# Patient Record
Sex: MALE | Race: WHITE
[De-identification: names, ages, dates, MRNs, and addresses within clinical notes are randomized per-mention and may not be internally consistent; named-entity substitution may affect disease eponyms.]

---

## 2019-08-17 ENCOUNTER — HOSPITAL ENCOUNTER (EMERGENCY)
Dept: HOSPITAL 44 - ED | Age: 34
Discharge: HOME | End: 2019-08-17
Payer: SELF-PAY

## 2019-08-17 VITALS — DIASTOLIC BLOOD PRESSURE: 58 MMHG | SYSTOLIC BLOOD PRESSURE: 118 MMHG

## 2019-08-17 DIAGNOSIS — M79.641: Primary | ICD-10-CM

## 2019-08-17 PROCEDURE — A9270 NON-COVERED ITEM OR SERVICE: HCPCS

## 2019-08-17 PROCEDURE — 99284 EMERGENCY DEPT VISIT MOD MDM: CPT

## 2019-08-17 PROCEDURE — 73130 X-RAY EXAM OF HAND: CPT

## 2019-08-17 PROCEDURE — 96372 THER/PROPH/DIAG INJ SC/IM: CPT

## 2019-08-17 NOTE — DIAGNOSTIC IMAGING REPORT
MELI MARTINEZ 

[facility]

[address] [facility] [address]

[place], [place]. [address]

 

 

 

 

Report Submission Date: Aug 17, 2019 6:17:51 PM CDT

Patient       Study

Name:   EDI DANIEL       Date:   Aug 17, 2019 5:56:40 PM CDT

MRN:   [id number]       Modality Type:   DX

Gender:   M       Description:   HAND 3 VIEWS OR MORE

:   [birth year]       Institution:   [facility]

Physician:   MELI MARTINEZ

     Accession:    [id number]

 

 

Right hand three views 



History:  3 days of hand pain 



Findings:  The right hand is intact without fracture, dislocation, arthropathy, 
or focal bone lesion.  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

 

Electronically signed on Aug 17, 2019 6:17:51 PM CDT by:

Jace VASQUEZ

## 2019-08-17 NOTE — ED PHYSICIAN DOCUMENTATION
Hand Injury





- HISTORIAN


Historian: patient





- HPI


Stated Complaint: right hand pain x 3 days


Chief Complaint: Hand Injury


Onset: days ago


Where: home


Severity: moderate


Duration: persistent since


Context: other (he is not sure )


Location of Injury: R hand


Modifying Factors: pain on movement


Further Comments: yes (HE states under on the ayala side he had an area two 

days ago he did get green/yellow drainage from the area. He states the area has 

healed somewhat although he states today the hand started to swell and there has

been increased pain over hand. No further drainage. He has not taken any meds 

OTC meds for the pain today. No fever. No injury)





- ROS


CONST: no problems


NEURO: none


CVS/RESP: none


EYES/ENT: none


MS/SKIN/LYMPH: other (small crusted area on right palm )





- PAST HX


Past History: none


Immunizations: UTD


Allergies/Adverse Reactions: 


                                    Allergies











Allergy/AdvReac Type Severity Reaction Status Date / Time


 


No Known Allergies Allergy   Verified 08/17/19 17:56














Home Medications: 


                                Ambulatory Orders











 Medication  Instructions  Recorded


 


NK  10/16/16














- SOCIAL HX


Smoking History: cigarettes


Alcohol Use: none


Drug Use: none





- FAMILY HX


Family History: none





- VITAL SIGNS


Vital Signs: 





                                   Vital Signs











Temp Pulse Resp BP Pulse Ox


 


          125/67    


 


          10/16/16 19:13   














- REVIEWED ASSESSMENTS


Nursing Assessment  Reviewed: Yes


Vitals Reviewed: Yes





Progress





- Progress


Progress: 





1850: Discussed results and plan he is agreeable. Discussed pain is slightly 

improved DG 





ED Results Lab/Radiology





- Radiology


Radiology Impressions: 





Right hand three views 





History: 3 days of hand pain 





Findings: The right hand is intact without fracture, dislocation, arthropathy, 

or focal bone lesion. 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 


Electronically signed on Aug 17, 2019 6:17:51 PM CDT by: 


Jace Garcia





Hand Injury Physical Exam





- Exam


General Appearance: no acute distress, alert


Hand: tenderness, soft tissue tenderness, swelling, limited ROM (with  due 

to swelling )


Wrist: normal inspection, non-tender


Neuro: sensation nml


Vascular: no vascular compromise


Forearm/Elbow/Arm: uninjured above wrist


Skin: warm/dry, normal color


Head/ENT: nml inspection


Neck/Back: nml inspection


Resp/CVS: chest non-tender, breath sounds nml, heart sounds nml, no resp. 

distress, lungs clear, reg. rate & rhythm


Abdomen: non-tender





Discharge


Clincal Impression: 


Hand pain


Qualifiers:


 Laterality: right Qualified Code(s): M79.641 - Pain in right hand





Referrals: 


Primary Doctor,No [Primary Care Provider] - 2 Days


Comments: 





1. Bactrim DS take 1 by mouth every 12 hours x 7 days


2. Tramodol 50 mg take 1 by mouth every 8 hours as needed for pain 


3. Epsom salt soaks/ice for comfort


4. OTC meds as directed as needed for symptom relief


5. See PCP in 2 days


6. Return to ER for any increasing concerns 


Condition: Stable


Disposition: 01 HOME, SELF-CARE


Decision to Admit: NO


Date of Decison to Admit: 08/17/19


Decision Time: 18:53